# Patient Record
Sex: FEMALE | Race: BLACK OR AFRICAN AMERICAN | NOT HISPANIC OR LATINO | Employment: OTHER | URBAN - METROPOLITAN AREA
[De-identification: names, ages, dates, MRNs, and addresses within clinical notes are randomized per-mention and may not be internally consistent; named-entity substitution may affect disease eponyms.]

---

## 2017-01-04 ENCOUNTER — TELEPHONE (OUTPATIENT)
Dept: HEMATOLOGY/ONCOLOGY | Facility: CLINIC | Age: 52
End: 2017-01-04

## 2017-01-04 NOTE — TELEPHONE ENCOUNTER
Asked Dr. Bermudez's advice for the kind of biopsy he would like to do for this patient.  I can then enter orders and get it scheduled per Dr. Stone's request.

## 2017-01-05 ENCOUNTER — TELEPHONE (OUTPATIENT)
Dept: ADMINISTRATIVE | Facility: OTHER | Age: 52
End: 2017-01-05

## 2017-01-05 NOTE — TELEPHONE ENCOUNTER
----- Message from Clifton Stone DO, RYAN sent at 12/29/2016  2:12 PM CST -----  Pt referred to IR for lung biopsy    Please let her know she was discussed in tumor Board with recommendations for a biopsy of her lung nodule with interventional radiology.

## 2017-01-17 ENCOUNTER — TELEPHONE (OUTPATIENT)
Dept: HEMATOLOGY/ONCOLOGY | Facility: CLINIC | Age: 52
End: 2017-01-17

## 2017-01-17 NOTE — TELEPHONE ENCOUNTER
Attempted to call the patient at home, no answer, LVM on daughter's number for them to call because patient needs a CT scan to have a lung biopsy done.

## 2017-01-17 NOTE — TELEPHONE ENCOUNTER
----- Message from Clifton Stone DO, RYAN sent at 1/17/2017 11:34 AM CST -----  Did this patient get set up for a lung biopsy?  What do we need?

## 2017-02-02 RX ORDER — ALBUTEROL SULFATE 90 UG/1
2 AEROSOL, METERED RESPIRATORY (INHALATION) 4 TIMES DAILY PRN
Refills: 0 | COMMUNITY
Start: 2017-01-23

## 2017-03-03 DIAGNOSIS — Z76.82 LIVER TRANSPLANT CANDIDATE: ICD-10-CM

## 2017-03-03 DIAGNOSIS — C22.0 HCC (HEPATOCELLULAR CARCINOMA): Primary | ICD-10-CM

## 2017-03-06 ENCOUNTER — TELEPHONE (OUTPATIENT)
Dept: TRANSPLANT | Facility: CLINIC | Age: 52
End: 2017-03-06

## 2017-03-07 ENCOUNTER — TELEPHONE (OUTPATIENT)
Dept: TRANSPLANT | Facility: CLINIC | Age: 52
End: 2017-03-07

## 2017-03-07 NOTE — TELEPHONE ENCOUNTER
Noted    ----- Message from Pedro De La Rosa sent at 3/6/2017  4:04 PM CST -----  Called pt to [facility] w/Chest, labs and f/u w/Kaye; she said that she was in the store and will call back.

## 2017-03-07 NOTE — TELEPHONE ENCOUNTER
Follow-up call placed to patient.  Patient advised of need to schedule txp follow-up with labs and ct c/a/p.  Understanding expressed.  Patient request to schedule follow-up in [place].  Orders completed.    Pedro De La Rosa,  notified.      ----- Message from Pedro De La Rosa sent at 3/6/2017  4:04 PM CST -----  Called pt to [facility] w/Chest, labs and f/u w/Kaye; she said that she was in the store and will call back.

## 2017-03-07 NOTE — TELEPHONE ENCOUNTER
Duplicate encounter.  Please disregard    ----- Message from Pedro De La Rosa sent at 3/6/2017  4:04 PM CST -----  Called pt to [facility] w/Chest, labs and f/u w/Kaye; she said that she was in the store and will call back.

## 2017-03-08 ENCOUNTER — TELEPHONE (OUTPATIENT)
Dept: TRANSPLANT | Facility: CLINIC | Age: 52
End: 2017-03-08

## 2017-03-08 NOTE — TELEPHONE ENCOUNTER
Per Juana, she will need updated clinicals to submit to Medicaid in order to request clearance for liver transplant evaluation.      Requested records emailed to Juana by Maynor Mccauley MA      ----- Message from Pedro De La Rosa sent at 3/8/2017 10:03 AM CST -----  Sp to Juana about pt insurance and she told me that the pt no longer has Medicare and she has a different Medicaid plan. Juana has deferred the pt episode because she needs to get clearance for the new Medicaid plan for her to be seen in transplant evaluation.

## 2017-03-17 DIAGNOSIS — C22.0 HCC (HEPATOCELLULAR CARCINOMA): Primary | ICD-10-CM

## 2017-03-17 DIAGNOSIS — Z76.82 LIVER TRANSPLANT CANDIDATE: ICD-10-CM

## 2017-03-23 ENCOUNTER — TELEPHONE (OUTPATIENT)
Dept: TRANSPLANT | Facility: CLINIC | Age: 52
End: 2017-03-23

## 2017-03-23 NOTE — TELEPHONE ENCOUNTER
Noted    ----- Message from Pedro De La Rosa sent at 3/17/2017 10:57 AM CDT -----  Regarding: Make note  LVM for NIB Med Scheduling Dept. to call shikha barfield.

## 2017-03-23 NOTE — TELEPHONE ENCOUNTER
Noted    ----- Message from Pedro De La Rosa sent at 3/17/2017 11:18 AM CDT -----  Regarding: Make note  Called pt at [phone], but the phone just rings. Tried to call her at other numbers listed in chart, but there was no answer. LVM for pt to call us back to go over instructions for CT A/P and chest.

## 2017-04-13 ENCOUNTER — OFFICE VISIT (OUTPATIENT)
Dept: HEPATOLOGY | Facility: CLINIC | Age: 52
End: 2017-04-13
Payer: MEDICAID

## 2017-04-13 DIAGNOSIS — C22.0 HEPATOCELLULAR CARCINOMA: Primary | ICD-10-CM

## 2017-04-13 PROCEDURE — 99499 UNLISTED E&M SERVICE: CPT | Mod: S$GLB,TXP,, | Performed by: INTERNAL MEDICINE

## 2017-04-18 ENCOUNTER — TELEPHONE (OUTPATIENT)
Dept: TRANSPLANT | Facility: CLINIC | Age: 52
End: 2017-04-18

## 2017-04-18 NOTE — TELEPHONE ENCOUNTER
Called patient on 18Apr2017 at 1545 about CT being done at [facility]. Patient informed me she never knew she needed to get that done there. Stated she was in [place] with a relative and will be back on Monday, 24Apr17. Told patient i would call her back on Monday to follow up. Patient stated no other issues at this time.

## 2017-05-02 ENCOUNTER — TELEPHONE (OUTPATIENT)
Dept: HEPATOLOGY | Facility: CLINIC | Age: 52
End: 2017-05-02

## 2017-05-02 NOTE — TELEPHONE ENCOUNTER
Chest Ct was ordered upon recommendation of Dr Juarez, do not see it done.  Pl investigate, patient was a no-show last week. Pl check whereabouts of patient.

## 2017-05-04 NOTE — TELEPHONE ENCOUNTER
Orders for labs and CT, including dedicated chest as requested by pulmonary in preparation for lung bx, were faxed to [facility] in early March.  Patient was notified and advised of need to schedule these appointments, as well as clinic follow-up.   Multiple follow-up calls were placed to patient by nurse, MA, and  to confirm appointments had been scheduled with no success in speaking with patient.      Will ask MA via this communication, to attempt contacting patient again, and instruct her to contact [facility] to schedule labs and CT, and coordinate follow-up with Dr. Kaye at [facility] location, for first available after testing completed.

## 2017-05-08 ENCOUNTER — TELEPHONE (OUTPATIENT)
Dept: TRANSPLANT | Facility: CLINIC | Age: 52
End: 2017-05-08

## 2017-05-08 NOTE — TELEPHONE ENCOUNTER
Called patient on 08May17 at 0900. SPoke with patient about scheduling appointment with provider, imaging and lab work. Provider appointment is schedules on 22Jun17 at 1340 in [place]. Fax over request for CT and blood work to [facility]. Fax received. Requested they schedule with patient ASAP prior to provider appointment. No issues stated at this time. Patient acknowledged that she needs to schedule appointment with [place] prior to providers appointment.

## 2017-05-08 NOTE — TELEPHONE ENCOUNTER
Noted    NATASHA Garcia Cc: Echo Kaye MD        Caller: Unspecified (6 days ago,  3:04 PM)                     Provider appointment has been made. Orders faxed to [facility] for imaging and labs and were received. [place] to schedule appointment with patient prior to providers appointment. Patient understands.

## 2017-06-20 ENCOUNTER — TELEPHONE (OUTPATIENT)
Dept: HEPATOLOGY | Facility: CLINIC | Age: 52
End: 2017-06-20

## 2017-06-20 NOTE — TELEPHONE ENCOUNTER
MA tried to call patient able to speak to her sister, inform her that we are going to cancel patient appt this coming Thursday 6/22/17 due to bad weather we are going to reschedule her appt 7/6/17. Patient sister agreed and she promise to let the patient know.     Mailed appt reminder to patient.BROOKE

## 2017-06-21 ENCOUNTER — TELEPHONE (OUTPATIENT)
Dept: TRANSPLANT | Facility: CLINIC | Age: 52
End: 2017-06-21

## 2017-06-21 NOTE — TELEPHONE ENCOUNTER
Called patient on [id number] at 1300. No Answer. No VM.    Called patient on [id number] at 0850. No answer. No VM.    Called patient on [id number] at 1115. Spoke with patient. Patient informed me that she didn't get her imaging or blood work done because she was told by [facility] that she did not have any appts. Spoke with Evelia in registation at [facility] on [id number] at 1120. Was informed that patient had appt on 22May17 at 0800 and patient was a no-show. Was able to reschedule imaging and blood work for [id number] at 0800. Called patient back at 1130, spoke with her and gave her the new appt time and date. Informed her that they had an appt scheduled, but she missed it. Patient stated that she showed up but was turned away. Patient is aware of new appt time and date. Informed her to call us if she had any issues or problems. Patient expressed understanding. No other issues at this time.

## 2017-07-06 ENCOUNTER — OFFICE VISIT (OUTPATIENT)
Dept: HEPATOLOGY | Facility: CLINIC | Age: 52
End: 2017-07-06
Payer: MEDICAID

## 2017-07-06 VITALS
HEIGHT: 69 IN | HEART RATE: 94 BPM | SYSTOLIC BLOOD PRESSURE: 111 MMHG | WEIGHT: 261 LBS | BODY MASS INDEX: 38.66 KG/M2 | RESPIRATION RATE: 20 BRPM | DIASTOLIC BLOOD PRESSURE: 76 MMHG

## 2017-07-06 DIAGNOSIS — E66.09 OBESITY DUE TO EXCESS CALORIES, UNSPECIFIED OBESITY SEVERITY: ICD-10-CM

## 2017-07-06 DIAGNOSIS — C22.0 HEPATOCELLULAR CARCINOMA: Primary | ICD-10-CM

## 2017-07-06 DIAGNOSIS — Z76.82 LIVER TRANSPLANT CANDIDATE: ICD-10-CM

## 2017-07-06 DIAGNOSIS — R91.8 PULMONARY NODULES: ICD-10-CM

## 2017-07-06 PROCEDURE — 99214 OFFICE O/P EST MOD 30 MIN: CPT | Mod: S$GLB,TXP,, | Performed by: INTERNAL MEDICINE
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
## 2017-07-08 ENCOUNTER — CONFERENCE (OUTPATIENT)
Dept: TRANSPLANT | Facility: CLINIC | Age: 52
End: 2017-07-08

## 2017-07-08 NOTE — TELEPHONE ENCOUNTER
.  Patient: Ashwini Pham       MRN: [id number]      : 1965     Age: 52 y.o.  [address] [facility] [address]    Provider: Hepatologist Debi Quintero)    Patient Transplant Status: In Evaluation    Reason for presentation: Reassessment    Clinical Summary: 52 y.o. female with no underlying chronic liver disease, had 3 lesions 3.7 cm, 2.4 cm and 1 cm in size on MRI dated 2015.  She is s/p TACE for liver lesions on 2015.  Alpha-fetoprotein has remained low, around 2.  Numerous pulmonary nodules seen on chest CT.  P    Imaging to be reviewed: CT c/a/p (disc to be uploaded Monday)    HCC Treatment History: TACE on 11/2/15    ABO: B POS    Platelets:   Lab Results   Component Value Date/Time     2016 08:29 AM    EXTPLATELETS 254 2016 02:31 PM     Creatinine:   Lab Results   Component Value Date/Time    CREATININE 0.8 2016 08:29 AM    CREATININE 0.8 2016    EXTCREATININ 0.84 2016 02:31 PM     Bilirubin:   Lab Results   Component Value Date/Time    BILITOT 0.6 2016 08:29 AM    EXTBILITOTAL 0.5 2016 02:31 PM    EXTBILIRUBIN 0.2 2016 06:33 AM     AFP Last 3 each if available:   Lab Results   Component Value Date/Time    AFP 2.8 2016 08:29 AM    AFP 3.4 10/06/2016 07:20 AM    EXTAFP 2.0 2016 02:31 PM    EXTAFP 2 2015 07:48 AM       MELD: MELD-Na score: 6 at 2016  8:29 AM  MELD score: 6 at 2016  8:29 AM  Calculated from:  Serum Creatinine: 0.8 mg/dL (Rounded to 1) at 2016  8:29 AM  Serum Sodium: 139 mmol/L (Rounded to 137) at 2016  8:29 AM  Total Bilirubin: 0.6 mg/dL (Rounded to 1) at 2016  8:29 AM  INR(ratio): 1.0 at 2016  8:29 AM  Age: 51 years    Plan: External disc was unable to be uploaded.  Case was not discussed.     Follow-up Provider:

## 2017-07-09 ENCOUNTER — TELEPHONE (OUTPATIENT)
Dept: HEPATOLOGY | Facility: CLINIC | Age: 52
End: 2017-07-09

## 2017-07-09 PROBLEM — R91.8 PULMONARY NODULES: Status: ACTIVE | Noted: 2017-07-09

## 2017-07-09 NOTE — TELEPHONE ENCOUNTER
Patient: Ashwini Pham       MRN: [id number]      : 1965     Age: 52 y.o.  [address] [address]    Provider: Hepatologist Debi Kaye    Patient Transplant Status: In Evaluation    Reason for presentation: Other Review chest CT for pulm nodules previously seen on abd CT in Oct 2016.    Clinical Summary: HCC in a patient with no prior liver disease, has been TACEd with last post-TACE CT showing two stable lesions. Multiple pulm nodules were seen on abd CT in Oct 2016, Chest CT was ordered, patient was lost to follow-up, hence chest CT was not done until now.      Imaging to be reviewed: CT abd Oct 2016, and outside chest CT. Submitted CD.     HCC Treatment History: None    ABO: B POS    Platelets:   Lab Results   Component Value Date/Time     2016 08:29 AM    EXTPLATELETS 254 2016 02:31 PM     Creatinine:   Lab Results   Component Value Date/Time    CREATININE 0.8 2016 08:29 AM    CREATININE 0.8 2016    EXTCREATININ 0.84 2016 02:31 PM     Bilirubin:   Lab Results   Component Value Date/Time    BILITOT 0.6 2016 08:29 AM    EXTBILITOTAL 0.5 2016 02:31 PM    EXTBILIRUBIN 0.2 2016 06:33 AM     AFP Last 3 each if available:   Lab Results   Component Value Date/Time    AFP 2.8 2016 08:29 AM    AFP 3.4 10/06/2016 07:20 AM    EXTAFP 2.0 2016 02:31 PM    EXTAFP 2 2015 07:48 AM       MELD: MELD-Na score: 6 at 2016  8:29 AM  MELD score: 6 at 2016  8:29 AM  Calculated from:  Serum Creatinine: 0.8 mg/dL (Rounded to 1) at 2016  8:29 AM  Serum Sodium: 139 mmol/L (Rounded to 137) at 2016  8:29 AM  Total Bilirubin: 0.6 mg/dL (Rounded to 1) at 2016  8:29 AM  INR(ratio): 1.0 at 2016  8:29 AM  Age: 51 years    Plan:     Follow-up Provider:

## 2017-07-09 NOTE — PATIENT INSTRUCTIONS
Plan:  -  Submit CT chest to IR conf for review.  Will need to be uploaded on Monday morning.   -  Return visit depends on review of CT.

## 2017-07-10 ENCOUNTER — DOCUMENTATION ONLY (OUTPATIENT)
Dept: TRANSPLANT | Facility: CLINIC | Age: 52
End: 2017-07-10

## 2017-08-30 ENCOUNTER — TELEPHONE (OUTPATIENT)
Dept: TRANSPLANT | Facility: CLINIC | Age: 52
End: 2017-08-30

## 2017-08-30 NOTE — TELEPHONE ENCOUNTER
Called patient on 30Aug17 at 1340. Patient informed me that she has papers that need filling out. Informed her that if she could fax the papers to us so we could see what she needs. Patient informed me that she would fax it to the number provided. No other issues at this time.     ----- Message from Isabel Santana RN sent at 8/30/2017 11:17 AM CDT -----  Contact: pt  Would you mind calling this patient to see what she wants  Thanks    ----- Message -----  From: Zara Grayson  Sent: 8/30/2017  10:43 AM  To: [facility] Pre-Liver Transplant Clinical    Calling to speak with Maria De Jesus about some insurance papers she has questions about please call her @ #

## 2017-09-11 ENCOUNTER — TELEPHONE (OUTPATIENT)
Dept: TRANSPLANT | Facility: CLINIC | Age: 52
End: 2017-09-11

## 2017-09-11 NOTE — TELEPHONE ENCOUNTER
Returned call to the patient. She wanted to know if we received a fax she had sent over (see NATASHA Mcguire's note from 8/30). I told her that I would need to speak with either Maria De Jesus or Maynor to see if they are working on this or if it needs to be re-faxed. I told her we would call her back tomorrow to let her know. She agrees to this plan and verbalized her understanding.   ----- Message from Michelle Cruz sent at 9/11/2017  2:10 PM CDT -----  Would like a call from maria de jesus. Please call  [phone]

## 2017-09-15 ENCOUNTER — TELEPHONE (OUTPATIENT)
Dept: TRANSPLANT | Facility: CLINIC | Age: 52
End: 2017-09-15

## 2017-09-15 NOTE — TELEPHONE ENCOUNTER
Call returned to patient.  Advised disability forms received and faxed to Medical Records-Disability Dept for completion.  Understanding expressed.     ----- Message from Delicia Molina RN sent at 9/11/2017  3:15 PM CDT -----  Regarding: insurance papers  Hey ,    The patient called 9/11 to see if we are working on those insurance papers that she faxed to us. I see a note from Maynor from 8/30 but nothing after that.. Are either of you working on this or does she need to re-fax it? Please let me know and I'll call the patient back.     Thanks,  Delicia

## 2017-09-18 ENCOUNTER — TELEPHONE (OUTPATIENT)
Dept: TRANSPLANT | Facility: CLINIC | Age: 52
End: 2017-09-18

## 2017-09-19 NOTE — TELEPHONE ENCOUNTER
Call returned. Ashwini advised disability form received and faxed to Medical Records for completion.  Understanding expressed.     ----- Message from Adamaris Jean sent at 9/18/2017  4:00 PM CDT -----  Contact: PT  Checking status on disability forms being completed.    Call [phone]

## 2017-09-21 ENCOUNTER — TELEPHONE (OUTPATIENT)
Dept: TRANSPLANT | Facility: CLINIC | Age: 52
End: 2017-09-21

## 2017-09-21 NOTE — TELEPHONE ENCOUNTER
Call returned.  Will f/u with MR in am    ----- Message from Zara Grayson sent at 9/21/2017  2:10 PM CDT -----  Contact: pt  Calling to speak with Maria De Jesus about disability forms has some questions please call @ # [phone]

## 2017-09-25 ENCOUNTER — TELEPHONE (OUTPATIENT)
Dept: TRANSPLANT | Facility: CLINIC | Age: 52
End: 2017-09-25

## 2017-09-25 NOTE — TELEPHONE ENCOUNTER
LATE ENTRY:  P/NATASHA Mcgregor. Disability  confirmed form was completed and mailed to patient on 9/20.  Voice message left for patient informing of above.  Contact numbers provided for a return call as needed.

## 2017-09-25 NOTE — TELEPHONE ENCOUNTER
SW returned call to patient today.  Pt calling about documents sent to Maria De Jesus Rendon RN pre liver nurse coord that were to be filled out and signed by Dr. Kaye.   Pt calling to state that the form was not filled out, but SW unable to understand what type of form pt is talking about.   It sounds like a special insurance reimbursement policy form.  Since JULIANNE unable to get a grasp of what pt talking about, JULIANNE sent a msg to Maria De Jesus Rendon about forms, so she can return pts call.   Pt verbalized understanding.   SW remains available.

## 2017-09-25 NOTE — TELEPHONE ENCOUNTER
----- Message from Niesha Lowery sent at 9/25/2017  1:59 PM CDT -----  Contact: patient  Patient would like a call back about her medical form that needs to be fill out.     Please call [phone]    Thanks!!

## 2017-10-04 ENCOUNTER — TELEPHONE (OUTPATIENT)
Dept: TRANSPLANT | Facility: CLINIC | Age: 52
End: 2017-10-04

## 2017-10-04 NOTE — TELEPHONE ENCOUNTER
Call placed to patient to discuss scheduling pulmonary follow as needed for clearance for liver transplant and her interest in pursuing transplant.      No answer at home and mobile numbers and unable to leave a voice message    Voice message left at work number (Rebecca Pham), requesting a return call to schedule remainder of evaluation. Contact numbers provided.

## 2017-10-13 ENCOUNTER — TELEPHONE (OUTPATIENT)
Dept: TRANSPLANT | Facility: CLINIC | Age: 52
End: 2017-10-13

## 2017-10-13 NOTE — TELEPHONE ENCOUNTER
Call returned.  Patient advised [facility] has not performed a liver biopsy.  Patient expressed understanding and request that any clinicals supporting her initial diagnosis be faxed to Meenakshi Zaidi @[phone].  Advised July 2015 MR, Sept 2015 Liver Conference note, Oct 2015 office note, and Nov 2015 TACE procedure note will be faxed.  Understanding expressed.     ----- Message from Michelle Cruz sent at 10/13/2017  3:58 PM CDT -----  Contact: patient   Patient calling to get a copy her pathology report. Patient would also like a call back.          Please call

## 2017-10-30 ENCOUNTER — TELEPHONE (OUTPATIENT)
Dept: TRANSPLANT | Facility: CLINIC | Age: 52
End: 2017-10-30

## 2017-10-30 NOTE — TELEPHONE ENCOUNTER
----- Message from Nina Jain RN sent at 10/27/2017  5:47 PM CDT -----  Contact: Meena Zaidi      ----- Message -----  From: Nina Jain RN  Sent: 10/26/2017   5:39 PM  To: Nina Jain RN        ----- Message -----  From: Niesha Lowery  Sent: 10/26/2017   3:18 PM  To: [facility] Pre-Liver Transplant Clinical    Meena Zaidi from the  Insurance Company Claim Department calling for information on this patient and would like a call         Please call [phone]        Thanks!!!

## 2017-10-30 NOTE — TELEPHONE ENCOUNTER
----- Message from Maria Del Carmen Melo RN sent at 10/30/2017  2:51 PM CDT -----  Contact: Pt      ----- Message -----  From: Carisa Valencia  Sent: 10/30/2017   2:16 PM  To: [facility] Pre-Liver Transplant Uday Pretty Pt would like to discuss the information that was faxed to her regarding insurance    Contact number [phone]    Thanks

## 2017-10-30 NOTE — TELEPHONE ENCOUNTER
Phone call returned to Meena w/ henna's insurance company.  No answer at this time.  Message left on .  Return phone call requested.  Contact info provided.  Awaiting callback.

## 2017-11-01 ENCOUNTER — TELEPHONE (OUTPATIENT)
Dept: TRANSPLANT | Facility: CLINIC | Age: 52
End: 2017-11-01

## 2017-11-01 NOTE — TELEPHONE ENCOUNTER
Call returned with no answer.  Unable to leave voice message    ----- Message from Michelle Cruz sent at 11/1/2017  2:25 PM CDT -----  Contact: patient   Patient would like a call from Maria De Jesus.         Please call

## 2017-11-02 ENCOUNTER — TELEPHONE (OUTPATIENT)
Dept: TRANSPLANT | Facility: CLINIC | Age: 52
End: 2017-11-02

## 2017-11-02 NOTE — TELEPHONE ENCOUNTER
Call returned with no answer and unable to leave voice message.    ----- Message from Nina Jain RN sent at 11/1/2017  5:42 PM CDT -----  Contact: patient       ----- Message -----  From: Michelle Cruz  Sent: 11/1/2017   4:44 PM  To: [facility] Pre-Liver Transplant Clinical      Patient retuning call       Please call

## 2017-11-02 NOTE — TELEPHONE ENCOUNTER
Call returned.  Ms. Pham states Meenakshi Zaidi with [employer] would like to speak with me in regards to patient's status.      Call placed to Meenakshi at [phone] with no answer.  Voice message left requesting a return call as needed. Contact numbers provided.    ----- Message from Carisa Valencia sent at 11/2/2017  1:28 PM CDT -----  Contact: Wero Pretty,    Pt is returning a call to you    Pt contact number [phone] or [phone]  Thanks

## 2017-11-03 ENCOUNTER — TELEPHONE (OUTPATIENT)
Dept: TRANSPLANT | Facility: CLINIC | Age: 52
End: 2017-11-03

## 2017-11-03 NOTE — TELEPHONE ENCOUNTER
Called patient on 03Nov17 at 0930. No answer. Unable to leave Vm. Called Daughters number, No answer. Left message on VM with contact info.     ----- Message from Maria De Jesus Rendon sent at 11/2/2017  9:48 PM CDT -----  Regarding: f/u +pulmonary  Please schedule clinic follow-up with Dr. Kaye at first available and notify patient.  She needs labs (cmp, cbc, pt/inr/AFP), CT c/a/p and follow-up with Dr Ariel Juarez in pulmonary (after chest ct).      Please schedule all on same day.

## 2017-11-06 ENCOUNTER — TELEPHONE (OUTPATIENT)
Dept: TRANSPLANT | Facility: CLINIC | Age: 52
End: 2017-11-06

## 2017-11-06 NOTE — TELEPHONE ENCOUNTER
Pulmonary schedule to fit patients schedule. Patient notified. NO other issues at this time.     [id number]: Still awaiting Pulmonary.    ----- Message from Maria De Jesus Rendon sent at 11/2/2017  9:48 PM CDT -----  Regarding: f/u +pulmonary  Please schedule clinic follow-up with Dr. Kaye at first available and notify patient.  She needs labs (cmp, cbc, pt/inr/AFP), CT c/a/p and follow-up with Dr Ariel Juarez in pulmonary (after chest ct).      Please schedule all on same day.

## 2017-11-10 ENCOUNTER — TELEPHONE (OUTPATIENT)
Dept: TRANSPLANT | Facility: CLINIC | Age: 52
End: 2017-11-10

## 2017-11-10 NOTE — TELEPHONE ENCOUNTER
Call returned.  Ms. Maradiaga states she was informed by Meenakshi Zaidi with [employer] that she has not gotten a call from [facility].  Ms Maradiaga informed that I have placed multiple calls, having to leave a message each time, since receipt of the initial message on Oct 30th.      Ms Maradiaga placed on hold during this call and another attempt was made to contact Ms Zaidi.  Advised the call was unanswered and another voice message was left.      Ms. Maradiaga states states she will follow-up with a supervisor at [facility].    ----- Message from Michelle Cruz sent at 11/10/2017 12:09 PM CST -----  Patient would like a call from Maria De Jesus.       Please call

## 2017-11-14 ENCOUNTER — TELEPHONE (OUTPATIENT)
Dept: TRANSPLANT | Facility: CLINIC | Age: 52
End: 2017-11-14

## 2017-11-14 NOTE — TELEPHONE ENCOUNTER
Call returned. Ms. Pham states she received a call to confirm her upcoming appointments and expressed awareness of Nov 15th schedule.    States she still has not been able to complete claim with [employer] because , Meenakshi Ziadi, states she has not been able to speak with anyone at [facility].  Advised multiple calls have been placed to Swapna Zaidi with no success.  Alternate number obtained from patient.    Call placed to [phone] (case#[id number]); spoke with Mare Vincent.  Mare states Meenakshi is not available but states she will relay message.  Contact number provided for return call.      Ms Pham notified.    ----- Message from Michelle Cruz sent at 11/14/2017  1:29 PM CST -----  Calling to speak with pedro       Please call

## 2017-11-17 ENCOUNTER — DOCUMENTATION ONLY (OUTPATIENT)
Dept: TRANSPLANT | Facility: CLINIC | Age: 52
End: 2017-11-17

## 2017-11-17 NOTE — NURSING
Chart review shows patient no showed for follow-up on November 15th, which included surveillance CT c/a/p, labs, transplant and pulmonary clinic follow-up.

## 2018-04-19 ENCOUNTER — DOCUMENTATION ONLY (OUTPATIENT)
Dept: TRANSPLANT | Facility: CLINIC | Age: 53
End: 2018-04-19

## 2018-04-19 NOTE — NURSING
Patient started liver transplant evaluation October 2016.  Evaluation remains incomplete due to multiple patient cancellations and no shows.  Case reviewed with Dr. Ornelas.  Transplant encounter to be resolved and patient to be followed in Hepatology.  Letter mailed to patient and Hepatology staff notified.

## 2018-05-16 ENCOUNTER — DOCUMENTATION ONLY (OUTPATIENT)
Dept: TRANSPLANT | Facility: CLINIC | Age: 53
End: 2018-05-16

## 2018-05-16 NOTE — NURSING
PHARM.D. PRE-TRANSPLANT NOTE:    This patient's medication therapy was evaluated as part of her pre-transplant evaluation.    The following pharmacologic concerns were noted: Aspirin, Naproxen, Oxycodone, Seroquel    This patient's medication profile was reviewed for contraindications for DAA Hepatitis C therapy:    [X]  No current inducers of CYP 3A4 or PGP  [X]  No amiodarone on this patient's EMR profile in the last 24 months  [X]  No past or current atrial fibrillation on this patient's EMR profile       Current Outpatient Prescriptions   Medication Sig Dispense Refill    albuterol (PROVENTIL) 2.5 mg /3 mL (0.083 %) nebulizer solution 2.5 mg every 8 (eight) hours as needed.  0    allopurinol (ZYLOPRIM) 300 MG tablet Take 300 mg by mouth once daily.      aspirin 81 MG Chew Take 81 mg by mouth once daily.      atorvastatin (LIPITOR) 40 MG tablet Take 40 mg by mouth once daily.      butalbital-aspirin-caffeine (BUTALBITAL COMPOUND) -40 mg Tab Take 1 tablet by mouth as needed (headaches).      colchicine 0.6 mg tablet Take 0.6 mg by mouth once daily.      cyclobenzaprine (FLEXERIL) 10 MG tablet Take 10 mg by mouth 3 (three) times daily as needed for Muscle spasms.      esomeprazole (NEXIUM) 40 MG capsule Take 40 mg by mouth before breakfast.      febuxostat (ULORIC) 40 mg Tab Take 40 mg by mouth once daily.      glimepiride (AMARYL) 2 MG tablet Take 2 mg by mouth before breakfast.      hydrochlorothiazide (MICROZIDE) 12.5 mg capsule Take 12.5 mg by mouth once daily.      labetalol (NORMODYNE) 200 MG tablet Take 200 mg by mouth every 12 (twelve) hours.      metformin (GLUCOPHAGE) 1000 MG tablet Take 1,000 mg by mouth 2 (two) times daily with meals.      naproxen (NAPROSYN) 500 MG tablet Take 500 mg by mouth 2 (two) times daily with meals.      ondansetron (ZOFRAN) 4 MG tablet Take 1 tablet (4 mg total) by mouth every 6 (six) hours as needed for Nausea. 28 tablet 0    oxycodone (ROXICODONE) 5 MG  immediate release tablet Take 1 tablet (5 mg total) by mouth every 6 (six) hours as needed for Pain. 20 tablet 0    quetiapine (SEROQUEL) 50 MG tablet Take 50 mg by mouth nightly.      theophylline 200 mg 24 hr capsule Take 200 mg by mouth 2 (two) times daily.      tramadol (ULTRAM) 50 mg tablet Take 50 mg by mouth every 6 (six) hours as needed for Pain.      VENTOLIN HFA 90 mcg/actuation inhaler 2 puffs 4 (four) times daily as needed.  0     No current facility-administered medications for this visit.          Currently she is responsible for preparing / administering this patient's medications on a daily basis.  I am available for consultation and can be contacted, as needed by the other members of the Liver Transplant team.

## 2018-05-25 ENCOUNTER — TELEPHONE (OUTPATIENT)
Dept: HEPATOLOGY | Facility: CLINIC | Age: 53
End: 2018-05-25

## 2018-05-25 NOTE — TELEPHONE ENCOUNTER
Pt scheduled for RTC f/u with Dr. Kaye in [place] 6/14/18. Appt slip printed and mailed to pt.     [facility]

## 2018-06-14 ENCOUNTER — OFFICE VISIT (OUTPATIENT)
Dept: HEPATOLOGY | Facility: CLINIC | Age: 53
End: 2018-06-14
Payer: MEDICARE

## 2018-06-14 VITALS
DIASTOLIC BLOOD PRESSURE: 101 MMHG | BODY MASS INDEX: 38.95 KG/M2 | HEIGHT: 69 IN | WEIGHT: 263 LBS | SYSTOLIC BLOOD PRESSURE: 165 MMHG | HEART RATE: 91 BPM

## 2018-06-14 DIAGNOSIS — C22.0 HEPATOCELLULAR CARCINOMA: Primary | ICD-10-CM

## 2018-06-14 LAB
EXT ALBUMIN: 3.3 (ref 3.4–4.8)
EXT ALKALINE PHOSPHATASE: 154 (ref 33–126)
EXT ALT: 41 (ref 0–55)
EXT AST: 44 (ref 5–34)
EXT BILIRUBIN TOTAL: 0.4 (ref 0.2–1.2)
EXT BUN: 15.65 (ref 9.8–20.1)
EXT CALCIUM: 9.79 (ref 8.4–10.2)
EXT CHLORIDE: 99 (ref 98–107)
EXT CO2: 31 (ref 22–32)
EXT CREATININE: 0.72 MG/DL (ref 0.57–1.11)
EXT GLUCOSE: 160 (ref 70–105)
EXT HEMATOCRIT: 45.7 (ref 34.1–44.9)
EXT HEMOGLOBIN: 15 (ref 11.2–15.7)
EXT INR: 1.1 (ref 0.9–1.2)
EXT PLATELETS: 265 (ref 140–369)
EXT POTASSIUM: 3.66 (ref 3.3–5.1)
EXT PROTEIN TOTAL: 7.7 (ref 6.4–8.3)
EXT PT: 12.1 (ref 9.8–13.2)
EXT SODIUM: 139 MMOL/L (ref 136–145)
EXT WBC: 6.66 (ref 3.98–10.04)

## 2018-06-14 PROCEDURE — 99214 OFFICE O/P EST MOD 30 MIN: CPT | Mod: S$GLB,,, | Performed by: INTERNAL MEDICINE

## 2018-06-14 NOTE — Clinical Note
Plan: -  Obtain CBC, CMP, PT INR, AFP today. -  MRI of the abdomen with and without contrast to reassess status of liver lesion. -  Please send a request also for chest CT to reassess pulmonary nodules -  Patient has new insurance, and may be eligible to complete her transplant evaluation or update her evaluation.  -  Low salt in the diet, avoid canned, bottled and processed foods. -  Continue current meds -  CBC, CMP, PT INR -  Ultrasound of abdomen and AFP every 6 months,  -  Avoid alcohol, smoking, sedatives and meds with codeine. -  Avoid high intake of Tylenol (more than 4 extra-strength pills in one day) -  Call us if any bleeding, fevers, confusion, disorientation occur -  Endoscopy: per local GI MD -  Transplant option discussed, will evaluate her compliance and then order updating of eval. -  Return in 3 months
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
## 2018-06-14 NOTE — Clinical Note
Plan:  Patient has new insurance, wants to go forward with updating liver transplant eval  -  Obtain CBC, CMP, PT INR, AFP today. -  MRI of the abdomen with and without contrast to reassess status of liver lesion. -  Please send a request also for chest CT to reassess pulmonary nodules -  Patient has new insurance, and may be eligible to complete her transplant evaluation or update her evaluation.  -  Low salt in the diet, avoid canned, bottled and processed foods. -  Continue current meds -  CBC, CMP, PT INR -  Ultrasound of abdomen and AFP every 6 months,  -  Avoid alcohol, smoking, sedatives and meds with codeine. -  Avoid high intake of Tylenol (more than 4 extra-strength pills in one day) -  Call us if any bleeding, fevers, confusion, disorientation occur -  Endoscopy: per local GI MD -  Transplant option discussed, will evaluate her compliance and then order updating of eval. -  Return in 3 months

## 2018-06-15 ENCOUNTER — TELEPHONE (OUTPATIENT)
Dept: TRANSPLANT | Facility: CLINIC | Age: 53
End: 2018-06-15

## 2018-06-15 NOTE — TELEPHONE ENCOUNTER
Initial referral received from  Dr Kaye.    Original referral from Bharat Souza MD  Patient with HCC .    Referred for liver transplant for  EVALUATION.    Referral completed and forwarded to [facility].  Patient has new insurance, wants to go forward with updating liver transplant eval     Insurance: Medicaid EPIC

## 2018-06-22 ENCOUNTER — TELEPHONE (OUTPATIENT)
Dept: TRANSPLANT | Facility: CLINIC | Age: 53
End: 2018-06-22

## 2018-06-22 NOTE — TELEPHONE ENCOUNTER
Spoke to the patient. Informed of clr for evaluation again.   Informed:her testing for the last evaluation is too old and will need to be repeated and she will have to come to [place] for the evaluation testing  At that point the pt stated she was at work and we would have to call her back.   Pt assigned to ALEX Rendon.

## 2018-07-06 ENCOUNTER — TELEPHONE (OUTPATIENT)
Dept: TRANSPLANT | Facility: CLINIC | Age: 53
End: 2018-07-06

## 2018-07-06 DIAGNOSIS — R91.8 PULMONARY NODULES: ICD-10-CM

## 2018-07-06 DIAGNOSIS — R91.1 LUNG NODULE: ICD-10-CM

## 2018-07-06 DIAGNOSIS — D49.0 LIVER NEOPLASM: ICD-10-CM

## 2018-07-06 DIAGNOSIS — C22.0 HCC (HEPATOCELLULAR CARCINOMA): ICD-10-CM

## 2018-07-06 DIAGNOSIS — Z76.82 LIVER TRANSPLANT CANDIDATE: Primary | ICD-10-CM

## 2018-07-06 DIAGNOSIS — I10 ESSENTIAL HYPERTENSION: ICD-10-CM

## 2018-07-09 NOTE — TELEPHONE ENCOUNTER
Spoke with Ms Núñezscar in reference to coordinating dates for liver transplant evaluation and surveillance MR and chest CT, as recommended at last office visit.  Standard and Fast Pass evaluation procedures reviewed, as well as tentative start dates.  Patient expressed understanding and inquired about being admitted for an inpatient evaluation.  Patient advised we are unable to get financial clearance for an inpatient evaluation unless it's medically necessary.  Understanding expressed.  Patient declined to starting a Standard evaluation within the 1-2 weeks but agreed to Fast Pass evaluation August 9th and 10th.  Patient advised of need for surveillance imaging,which should be scheduled at [facility], as we have been unable to access the information on the radiology disc from [place].  Patient expressed understanding and request to have imaging included with the evaluation tests.  Tentative evaluation schedule reviewed.  Advised of the need to fast after midnight August 8th and need to have caregiver attend.  All questions and concerns addressed.   Caregiver and Fast Pass appointment letters mailed.

## 2018-07-23 DIAGNOSIS — Z76.82 LIVER TRANSPLANT CANDIDATE: Primary | ICD-10-CM

## 2018-07-23 DIAGNOSIS — C22.0 HCC (HEPATOCELLULAR CARCINOMA): ICD-10-CM

## 2018-08-07 ENCOUNTER — TELEPHONE (OUTPATIENT)
Dept: TRANSPLANT | Facility: CLINIC | Age: 53
End: 2018-08-07

## 2018-08-07 NOTE — TELEPHONE ENCOUNTER
Patient called to confirm that she will be attending her scheduled appointments for Liver Transplant Fast Pass Evaluations scheduled for 8/9/2018 at 0730.   Patient confirms that her caregiver (her daughter) will be accompanying her to the appointments.   Patient appointments reviewed along with special instructions.  Patient stated that she understood the information provided, and had no questions at this time.

## 2018-10-22 ENCOUNTER — TELEPHONE (OUTPATIENT)
Dept: TRANSPLANT | Facility: CLINIC | Age: 53
End: 2018-10-22

## 2018-10-22 NOTE — TELEPHONE ENCOUNTER
Call placed to patient to discuss rescheduling liver transplant evaluation.  Home number answered by her daughter who states Ms Pa has not made it home.  Message left advising of the reason for the call with request for a return call.     No answer at mobile number and unable to leave a voice message because the voice mailbox has not been set up.

## 2019-01-30 ENCOUNTER — TELEPHONE (OUTPATIENT)
Dept: TRANSPLANT | Facility: CLINIC | Age: 54
End: 2019-01-30

## 2019-01-30 NOTE — TELEPHONE ENCOUNTER
Call placed to patient to follow-up on rescheduling of liver transplant evaluation.  Patient states previously she was without health insurance and was unable to schedule any appointments.  States she now has coverage with [employer] but does not have the policy or group number available but agreed to call back with the information.      Patient she is now ready to proceed with liver transplant evaluation and RTC with Dr. Kaye.  Agreed to start at first available and is prepared to stay 3-4 days for a Standard evaluation.  Request [employer] reservation, if available.    Patient advised, we are able to scheduled within the next 2-3 weeks but will have to verify insurance before starting.  Understanding expressed.

## 2019-04-11 ENCOUNTER — TELEPHONE (OUTPATIENT)
Dept: TRANSPLANT | Facility: CLINIC | Age: 54
End: 2019-04-11

## 2019-04-11 NOTE — TELEPHONE ENCOUNTER
LATE ENTRY 1050:  Per , patient has both Medicare and [place] Medicaid.  Patient has financial clearance for routine follow-up and liver transplant evaluation.

## 2019-04-11 NOTE — TELEPHONE ENCOUNTER
Return call placed to patient at listed home and mobile numbers with no answer and unable to leave a voice message.      Call placed to listed work number (dtr's mobile number).  Daughter states she's not with patient and advised that I call the mobile and home numbers.  Writer informed daughter that those numbers were tried first with no answer.  Daughter states she will contact patient and have her call.  Contact numbers provided.     Will await patient call.

## 2019-04-11 NOTE — TELEPHONE ENCOUNTER
"Follow-up call placed to patient to discuss plans to move forward with evaluation and obtain insurance information.  Patient states "I'm waiting on my new insurance cards."    Advised per previous conversation, new insurance had been obtained.  Patient acknowledged but states since then, she received a letter stating she's no longer eligible for that coverage.  Says she applied for a different plan and is waiting on the new insurance cards.    Patient advised she is over due for clinic follow-up, labs and tumor surveillance.  Writer inquired as to patient's coverage to proceed with scheduling follow-up.  Patient questioned if appointments will be scheduled in [place].  Writer informed patient appointments can be scheduled in [place].  Patient then stated "I have the red-white-blue card."  Patient declined scheduling at this time, stating "I'm going to follow-up with the lady," and request a return call after 1400.  Will follow-up accordingly.    Message forwarded to financial services, asking for verification of insurance coverage in regards to routine and transplant services.     Will follow-up accordingly.         "

## 2019-04-25 ENCOUNTER — TELEPHONE (OUTPATIENT)
Dept: TRANSPLANT | Facility: CLINIC | Age: 54
End: 2019-04-25

## 2019-04-25 NOTE — TELEPHONE ENCOUNTER
"Call placed to patient to discuss insurance concerns and schedule follow-up.  There was no answer at listed home number.  Voice message left with "Zach" at mobile number.  Contact numbers provided for a return call upon receipt of message  "

## 2019-06-08 ENCOUNTER — TELEPHONE (OUTPATIENT)
Dept: TRANSPLANT | Facility: CLINIC | Age: 54
End: 2019-06-08

## 2019-06-08 NOTE — TELEPHONE ENCOUNTER
No response from patient after multiple calls and letter mailed, requesting to reschedule clinic follow-up, surveillance imaging, and evaluation.  [place] encounter resolved and Hepatology staff notified, via this communtication, of the need for patient to follow-up with Dr. Kaye in [place] at first available.

## 2019-06-10 ENCOUNTER — TELEPHONE (OUTPATIENT)
Dept: HEPATOLOGY | Facility: CLINIC | Age: 54
End: 2019-06-10

## 2019-06-10 NOTE — TELEPHONE ENCOUNTER
Called spoke with patient's son-in-law and granddaughter stated to call back at 1400 that Ashwini Pham was at work at present time

## 2019-06-10 NOTE — TELEPHONE ENCOUNTER
Family stated call after 3pm that's what time she gets off. I called @ 3:30pm and @ 4:30pm no answer, left VM, will send letter unable to contact

## 2020-09-17 NOTE — TELEPHONE ENCOUNTER
Call returned and was answered with no response on receiving end to greeting, then disconnected.      Repeat call placed with no answer of voice messaging system.      ----- Message from Clarisa Hartley sent at 9/25/2017  3:15 PM CDT -----  Regarding: questions about form not being completed?   Niesha CARMONA [facility]  Caller: patient (Today,  1:59 PM)         Patient would like a call back about her medical form that needs to be fill out.     Please call [phone]          [Weight counseling provided] : weight counseling provided [Mediterranean diet recommended] : Mediterranean diet recommended [Non - Smoker] : non-smoker [Smoke/CO Detectors] : smoke/CO detectors [Use grab bars] : use grab bars [Medical alert] : medical alert [Use assistive device to avoid falls] : use assistive device to avoid falls [Remove clutter and unsafe carpeting to avoid falls] : remove clutter and unsafe carpeting to avoid falls [] : foot exam [Completed] : Aspirin use discussion completed [Improve exercise tolerance] : improve exercise tolerance [Improve mobility] : improve mobility [Improve weight] : improve weight [Improve pain control] : improve pain control [Decrease stress] : decrease stress [Decrease hospital use] : decrease hospital use [Minimize unnecessary interventions] : minimize unnecessary interventions [Comfort Care] : comfort care [Maintain functional ability] : maintain functional ability [Discussed disease trajectory with patient/caregiver] : discussed disease trajectory with patient/caregiver [Likely to achieve goals/desired outcomes] : likely to achieve goals/desired outcomes [Patient/Caregiver has ___ understanding of disease process] : patient/caregiver has [unfilled] understanding of disease process [Advanced Directives discussed: ____] : Advanced directives discussed: [unfilled] [Established patient, extensive review of history, medical and functional status, risk factors and patient education] : Established patient, extensive review of history, medical and functional status, risk factors and patient education and counseling provided for subsequent annual wellness visit [de-identified] : unclear of wishes